# Patient Record
Sex: MALE | URBAN - METROPOLITAN AREA
[De-identification: names, ages, dates, MRNs, and addresses within clinical notes are randomized per-mention and may not be internally consistent; named-entity substitution may affect disease eponyms.]

---
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
## 2025-04-23 NOTE — PROCEDURE: MIPS QUALITY
Glenn Alonso MD  P RAMON Alonso Nurse Msg Pool  Hgba1c high  Should be treated  See if willing  See dietician  If so get a list of covered meds      [facility]    4/23/25  5:41 AM  Result Note  4/23/25 ACL able to add hgb A1c. Lab order submitted. Wait results  Comprehensive Metabolic Panel  [facility]    4/23/25  5:40 AM  Result Note  4/23/25 urine was cultured and results are currently in process.  Prothrombin Time (INR/PT); Urinalysis & Reflex Microscopy With Culture If Indicated; CBC with Automated Differential (performable only)    Glenn Alonso MD to RAMON Alonso Nurse Msg Pool        4/22/25  9:07 PM  Result Note  Hgba1c   Repeat alk phos  
Quality 226: Preventive Care And Screening: Tobacco Use: Screening And Cessation Intervention: Tobacco Screening not Performed for Medical Reasons
Detail Level: Detailed
Quality 431: Preventive Care And Screening: Unhealthy Alcohol Use - Screening: Unhealthy alcohol use screening not performed, reason not otherwise specified
Quality 402: Tobacco Use And Help With Quitting Among Adolescents: Tobacco Screening OR Tobacco Cessation Intervention not Performed Reason Not Otherwise Specified
Quality 110: Preventive Care And Screening: Influenza Immunization: Influenza immunization was not ordered or administered, reason not given